# Patient Record
Sex: FEMALE | Race: WHITE | NOT HISPANIC OR LATINO | Employment: FULL TIME | URBAN - METROPOLITAN AREA
[De-identification: names, ages, dates, MRNs, and addresses within clinical notes are randomized per-mention and may not be internally consistent; named-entity substitution may affect disease eponyms.]

---

## 2023-06-05 RX ORDER — BIOTIN 1 MG
1000 TABLET ORAL DAILY
COMMUNITY

## 2023-06-05 RX ORDER — HYDROXYZINE HYDROCHLORIDE 10 MG/1
TABLET, FILM COATED ORAL
COMMUNITY
Start: 2023-02-24

## 2023-06-05 RX ORDER — DROSPIRENONE AND ETHINYL ESTRADIOL 0.02-3(28)
KIT ORAL
COMMUNITY
End: 2023-06-06

## 2023-06-05 RX ORDER — PERPHENAZINE 4 MG
TABLET ORAL
COMMUNITY
Start: 2015-01-01

## 2023-06-05 RX ORDER — CEPHALEXIN 250 MG
CAPSULE ORAL
COMMUNITY

## 2023-06-05 RX ORDER — UREA 10 %
1 LOTION (ML) TOPICAL DAILY
COMMUNITY

## 2023-06-05 RX ORDER — MINOXIDIL 2.5 MG/1
TABLET ORAL
COMMUNITY
Start: 2023-05-05

## 2023-06-05 RX ORDER — DROSPIRENONE AND ETHINYL ESTRADIOL 0.02-3(28)
1 KIT ORAL DAILY
COMMUNITY

## 2023-06-05 RX ORDER — OMEGA-3 FATTY ACIDS CAP DELAYED RELEASE 1000 MG 1000 MG
CAPSULE DELAYED RELEASE ORAL
COMMUNITY
Start: 2000-01-01

## 2023-06-05 RX ORDER — CLOBETASOL PROPIONATE 0.5 MG/G
OINTMENT TOPICAL
COMMUNITY

## 2023-06-06 ENCOUNTER — OFFICE VISIT (OUTPATIENT)
Dept: GASTROENTEROLOGY | Facility: CLINIC | Age: 48
End: 2023-06-06
Payer: COMMERCIAL

## 2023-06-06 VITALS
BODY MASS INDEX: 25.83 KG/M2 | OXYGEN SATURATION: 98 % | HEIGHT: 63 IN | HEART RATE: 65 BPM | WEIGHT: 145.8 LBS | SYSTOLIC BLOOD PRESSURE: 124 MMHG | DIASTOLIC BLOOD PRESSURE: 68 MMHG

## 2023-06-06 DIAGNOSIS — R14.0 BLOATING: Primary | ICD-10-CM

## 2023-06-06 DIAGNOSIS — K59.01 SLOW TRANSIT CONSTIPATION: ICD-10-CM

## 2023-06-06 PROCEDURE — 99204 OFFICE O/P NEW MOD 45 MIN: CPT | Performed by: INTERNAL MEDICINE

## 2023-06-06 NOTE — H&P (VIEW-ONLY)
Consultation - [facility]) [facility]  Alejandra Irving 1975 57 y o  female     ASSESSMENT @ PLAN:   She is a 16-year-old female who needs colon cancer screening  In addition she reports lifelong bloating and constipation that has responded to a combination of Juan seeds probiotics and digestive enzymes  She is straining hardness of stools and incomplete evacuations and sometimes abdominal pain with nausea  1 we will do a colonoscopy to investigate    2 I told her to continue on her probiotic regimen and to add 1 scoop of MiraLAX daily    3 we will order celiac serology panel    She may need follow-up    Chief Complaint: Abdominal pain constipation    HPI: She is a 16-year-old female with abdominal pain and constipation  She reports lifelong issues with constipation and bloating in her own words  She reports this is manifested as inflammation in her body and her own words  She reports this is worsened over the last 1 to 2 years  She is now going through her divorce which is obviously a very stressful event  She reports feeling nausea  She has no melena or hematochezia  She does have straining hardness of stools and incomplete evacuation  She has a bowel movement about once a week or once every 2 weeks  For a while she has been taking a regimen of Juan seeds probiotics and digestive enzymes and now she has a bowel movement about every 2 days  She has less straining and less hardness of stools now she sometimes has abdominal pain but not a lot  Nobody in the family has Crohn's ulcerative colitis or colon malignancy  Nobody in the family has celiac disease either  She has no upper abdominal symptoms of heartburn regurgitation dysphagia  She has never had a colonoscopy  She does report having a negative stool Cologuard  She really does not want dicyclomine or cramping pill or really a pharmaceutical medication      REVIEW OF SYSTEMS:     CONSTITUTIONAL: Denies any fever, chills, or rigors  Good appetite, and no recent weight loss  HEENT: No earache or tinnitus  Denies hearing loss or visual disturbances  CARDIOVASCULAR: No chest pain or palpitations  RESPIRATORY: Denies any cough, hemoptysis, shortness of breath or dyspnea on exertion  GASTROINTESTINAL: As noted in the History of Present Illness  GENITOURINARY: No problems with urination  Denies any hematuria or dysuria  NEUROLOGIC: No dizziness or vertigo, denies headaches  MUSCULOSKELETAL: Denies any muscle or joint pain  SKIN: Denies skin rashes or itching  ENDOCRINE: Denies excessive thirst  Denies intolerance to heat or cold  PSYCHOSOCIAL: Denies depression or anxiety  Denies any recent memory loss       Past Medical History:   Diagnosis Date   • Fibromyalgia       Past Surgical History:   Procedure Laterality Date   • FOOT SURGERY Right 2021     Social History     Socioeconomic History   • Marital status: Legally      Spouse name: Not on file   • Number of children: Not on file   • Years of education: Not on file   • Highest education level: Not on file   Occupational History   • Not on file   Tobacco Use   • Smoking status: Former     Types: Cigarettes     Quit date: 2008     Years since quitting: 15 4   • Smokeless tobacco: Never   Vaping Use   • Vaping Use: Never used   Substance and Sexual Activity   • Alcohol use: Yes     Comment: rarely   • Drug use: Never   • Sexual activity: Not on file   Other Topics Concern   • Not on file   Social History Narrative   • Not on file     Social Determinants of Health     Financial Resource Strain: Not on file   Food Insecurity: Not on file   Transportation Needs: Not on file   Physical Activity: Not on file   Stress: Not on file   Social Connections: Not on file   Intimate Partner Violence: Not on file   Housing Stability: Not on file     Family History   Problem Relation Age of Onset   • Arthritis Mother    • COPD Father    • Raynaud syndrome Sister    • Heart attack "Maternal Grandfather    • Stomach cancer Paternal Grandmother      Latex, Nickel, Pollen extract, Rhododendron, and Penicillins  Current Outpatient Medications   Medication Sig Dispense Refill   • Ashwagandha 125 MG CAPS Take by mouth     • Biotin 1000 MCG tablet Take 1,000 mcg by mouth daily     • clobetasol (TEMOVATE) 0 05 % ointment      • Collagen-Vitamin C-Biotin (Collagen 1500/C) 500-50-0 8 MG CAPS      • drospirenone-ethinyl estradiol (BRYAN) 3-0 02 MG per tablet Take 1 tablet by mouth daily     • hydrOXYzine HCL (ATARAX) 10 mg tablet      • Lactobacillus TABS Take 1 tablet by mouth daily     • minoxidil (LONITEN) 2 5 mg tablet      • Multiple Vitamins-Minerals (Multivitamin & Mineral) LIQD Take by mouth     • Omega-3 Fatty Acids (Fish Oil) 1000 MG CPDR      • Probiotic Product (UP4 PROBIOTICS ADULT PO)      • Turmeric (QC TUMERIC COMPLEX PO)        No current facility-administered medications for this visit  Blood pressure 124/68, pulse 65, height 5' 3\" (1 6 m), weight 66 1 kg (145 lb 12 8 oz), SpO2 98 %  PHYSICAL EXAM:     General Appearance:   Alert, cooperative, no distress, appears stated age    HEENT:   Normocephalic, atraumatic, anicteric      Neck:  Supple, symmetrical, trachea midline, no adenopathy;    thyroid: no enlargement/tenderness/nodules; no carotid  bruit or JVD    Lungs:   Clear to auscultation bilaterally; no rales, rhonchi or wheezing; respirations unlabored    Heart[de-identified]   S1 and S2 normal; regular rate and rhythm; no murmur, rub, or gallop     Abdomen:   Soft, non-tender, non-distended; normal bowel sounds; no masses, no organomegaly    Genitalia:   Deferred    Rectal:   Deferred    Extremities:  No cyanosis, clubbing or edema    Pulses:  2+ and symmetric all extremities    Skin:  Skin color, texture, turgor normal, no rashes or lesions    Lymph nodes:  No palpable cervical, axillary or inguinal lymphadenopathy        No results found for: \"HCT\", \"HGB\", \"MCV\", \"PLT\", \"WBC\"  No " "results found for: \"BUN\", \"CALCIUM\", \"CL\", \"CO2\", \"CREATININE\", \"GLUCOSE\", \"K\", \"NA\"  No results found for: \"ALKPHOS\", \"ALT\", \"AST\", \"BILITOT\", \"GGT\"  No results found for: \"INR\", \"PROTIME\"        Answers for HPI/ROS submitted by the patient on 5/30/2023  Chronicity: chronic  Onset: more than 1 year ago  Onset quality: gradual  Frequency: daily  Episode duration: 12 Months  Progression since onset: waxing and waning  Pain location: generalized abdominal region  Pain - numeric: 5/10  Pain quality: aching, burning, cramping, dull  Radiates to: does not radiate  anorexia: No  arthralgias: Yes  belching: No  constipation: Yes  diarrhea: No  dysuria: No  fever: No  flatus: No  frequency: Yes  headaches: No  hematochezia: No  hematuria: Yes  melena: No  myalgias: Yes  nausea: Yes  weight loss: No  vomiting: No  Aggravated by: nothing  Relieved by: nothing      "

## 2023-06-06 NOTE — PROGRESS NOTES
Consultation - [facility]  Kanchan Quinones 1975 57 y o  female     ASSESSMENT @ PLAN:   She is a 53-year-old female who needs colon cancer screening  In addition she reports lifelong bloating and constipation that has responded to a combination of Juan seeds probiotics and digestive enzymes  She is straining hardness of stools and incomplete evacuations and sometimes abdominal pain with nausea  1 we will do a colonoscopy to investigate    2 I told her to continue on her probiotic regimen and to add 1 scoop of MiraLAX daily    3 we will order celiac serology panel    She may need follow-up    Chief Complaint: Abdominal pain constipation    HPI: She is a 53-year-old female with abdominal pain and constipation  She reports lifelong issues with constipation and bloating in her own words  She reports this is manifested as inflammation in her body and her own words  She reports this is worsened over the last 1 to 2 years  She is now going through her divorce which is obviously a very stressful event  She reports feeling nausea  She has no melena or hematochezia  She does have straining hardness of stools and incomplete evacuation  She has a bowel movement about once a week or once every 2 weeks  For a while she has been taking a regimen of Juan seeds probiotics and digestive enzymes and now she has a bowel movement about every 2 days  She has less straining and less hardness of stools now she sometimes has abdominal pain but not a lot  Nobody in the family has Crohn's ulcerative colitis or colon malignancy  Nobody in the family has celiac disease either  She has no upper abdominal symptoms of heartburn regurgitation dysphagia  She has never had a colonoscopy  She does report having a negative stool Cologuard  She really does not want dicyclomine or cramping pill or really a pharmaceutical medication      REVIEW OF SYSTEMS:     CONSTITUTIONAL: Denies any fever, chills, or rigors  Good appetite, and no recent weight loss  HEENT: No earache or tinnitus  Denies hearing loss or visual disturbances  CARDIOVASCULAR: No chest pain or palpitations  RESPIRATORY: Denies any cough, hemoptysis, shortness of breath or dyspnea on exertion  GASTROINTESTINAL: As noted in the History of Present Illness  GENITOURINARY: No problems with urination  Denies any hematuria or dysuria  NEUROLOGIC: No dizziness or vertigo, denies headaches  MUSCULOSKELETAL: Denies any muscle or joint pain  SKIN: Denies skin rashes or itching  ENDOCRINE: Denies excessive thirst  Denies intolerance to heat or cold  PSYCHOSOCIAL: Denies depression or anxiety  Denies any recent memory loss       Past Medical History:   Diagnosis Date   • Fibromyalgia       Past Surgical History:   Procedure Laterality Date   • FOOT SURGERY Right 2021     Social History     Socioeconomic History   • Marital status: Legally      Spouse name: Not on file   • Number of children: Not on file   • Years of education: Not on file   • Highest education level: Not on file   Occupational History   • Not on file   Tobacco Use   • Smoking status: Former     Types: Cigarettes     Quit date: 2008     Years since quitting: 15 4   • Smokeless tobacco: Never   Vaping Use   • Vaping Use: Never used   Substance and Sexual Activity   • Alcohol use: Yes     Comment: rarely   • Drug use: Never   • Sexual activity: Not on file   Other Topics Concern   • Not on file   Social History Narrative   • Not on file     Social Determinants of Health     Financial Resource Strain: Not on file   Food Insecurity: Not on file   Transportation Needs: Not on file   Physical Activity: Not on file   Stress: Not on file   Social Connections: Not on file   Intimate Partner Violence: Not on file   Housing Stability: Not on file     Family History   Problem Relation Age of Onset   • Arthritis Mother    • COPD Father    • Raynaud syndrome Sister    • Heart attack "Maternal Grandfather    • Stomach cancer Paternal Grandmother      Latex, Nickel, Pollen extract, Rhododendron, and Penicillins  Current Outpatient Medications   Medication Sig Dispense Refill   • Ashwagandha 125 MG CAPS Take by mouth     • Biotin 1000 MCG tablet Take 1,000 mcg by mouth daily     • clobetasol (TEMOVATE) 0 05 % ointment      • Collagen-Vitamin C-Biotin (Collagen 1500/C) 500-50-0 8 MG CAPS      • drospirenone-ethinyl estradiol (BRYAN) 3-0 02 MG per tablet Take 1 tablet by mouth daily     • hydrOXYzine HCL (ATARAX) 10 mg tablet      • Lactobacillus TABS Take 1 tablet by mouth daily     • minoxidil (LONITEN) 2 5 mg tablet      • Multiple Vitamins-Minerals (Multivitamin & Mineral) LIQD Take by mouth     • Omega-3 Fatty Acids (Fish Oil) 1000 MG CPDR      • Probiotic Product (UP4 PROBIOTICS ADULT PO)      • Turmeric (QC TUMERIC COMPLEX PO)        No current facility-administered medications for this visit  Blood pressure 124/68, pulse 65, height 5' 3\" (1 6 m), weight 66 1 kg (145 lb 12 8 oz), SpO2 98 %  PHYSICAL EXAM:     General Appearance:   Alert, cooperative, no distress, appears stated age    HEENT:   Normocephalic, atraumatic, anicteric      Neck:  Supple, symmetrical, trachea midline, no adenopathy;    thyroid: no enlargement/tenderness/nodules; no carotid  bruit or JVD    Lungs:   Clear to auscultation bilaterally; no rales, rhonchi or wheezing; respirations unlabored    Heart[de-identified]   S1 and S2 normal; regular rate and rhythm; no murmur, rub, or gallop     Abdomen:   Soft, non-tender, non-distended; normal bowel sounds; no masses, no organomegaly    Genitalia:   Deferred    Rectal:   Deferred    Extremities:  No cyanosis, clubbing or edema    Pulses:  2+ and symmetric all extremities    Skin:  Skin color, texture, turgor normal, no rashes or lesions    Lymph nodes:  No palpable cervical, axillary or inguinal lymphadenopathy        No results found for: \"HCT\", \"HGB\", \"MCV\", \"PLT\", \"WBC\"  No " "results found for: \"BUN\", \"CALCIUM\", \"CL\", \"CO2\", \"CREATININE\", \"GLUCOSE\", \"K\", \"NA\"  No results found for: \"ALKPHOS\", \"ALT\", \"AST\", \"BILITOT\", \"GGT\"  No results found for: \"INR\", \"PROTIME\"        Answers for HPI/ROS submitted by the patient on 5/30/2023  Chronicity: chronic  Onset: more than 1 year ago  Onset quality: gradual  Frequency: daily  Episode duration: 12 Months  Progression since onset: waxing and waning  Pain location: generalized abdominal region  Pain - numeric: 5/10  Pain quality: aching, burning, cramping, dull  Radiates to: does not radiate  anorexia: No  arthralgias: Yes  belching: No  constipation: Yes  diarrhea: No  dysuria: No  fever: No  flatus: No  frequency: Yes  headaches: No  hematochezia: No  hematuria: Yes  melena: No  myalgias: Yes  nausea: Yes  weight loss: No  vomiting: No  Aggravated by: nothing  Relieved by: nothing      "

## 2023-06-06 NOTE — PATIENT INSTRUCTIONS
Scheduled date of colonoscopy (as of today):6/24/23  Physician performing colonoscopy: David  Location of colonoscopy:[place]  Bowel prep reviewed with patient:miralax/dulcolax  Instructions reviewed with patient by:Sharon MARTEL  Clearances:  none

## 2023-06-24 ENCOUNTER — ANESTHESIA EVENT (OUTPATIENT)
Dept: GASTROENTEROLOGY | Facility: HOSPITAL | Age: 48
End: 2023-06-24

## 2023-06-24 ENCOUNTER — APPOINTMENT (OUTPATIENT)
Dept: LAB | Facility: HOSPITAL | Age: 48
End: 2023-06-24
Payer: COMMERCIAL

## 2023-06-24 ENCOUNTER — ANESTHESIA (OUTPATIENT)
Dept: GASTROENTEROLOGY | Facility: HOSPITAL | Age: 48
End: 2023-06-24

## 2023-06-24 ENCOUNTER — HOSPITAL ENCOUNTER (OUTPATIENT)
Dept: GASTROENTEROLOGY | Facility: HOSPITAL | Age: 48
Setting detail: OUTPATIENT SURGERY
Discharge: HOME | End: 2023-06-24
Attending: INTERNAL MEDICINE | Admitting: INTERNAL MEDICINE
Payer: COMMERCIAL

## 2023-06-24 VITALS
OXYGEN SATURATION: 100 % | DIASTOLIC BLOOD PRESSURE: 57 MMHG | HEIGHT: 63 IN | WEIGHT: 145.28 LBS | SYSTOLIC BLOOD PRESSURE: 105 MMHG | TEMPERATURE: 97.5 F | HEART RATE: 60 BPM | RESPIRATION RATE: 20 BRPM | BODY MASS INDEX: 25.74 KG/M2

## 2023-06-24 DIAGNOSIS — K59.01 SLOW TRANSIT CONSTIPATION: ICD-10-CM

## 2023-06-24 DIAGNOSIS — R14.0 BLOATING: ICD-10-CM

## 2023-06-24 LAB
EXT PREGNANCY TEST URINE: NEGATIVE
EXT. CONTROL: NORMAL

## 2023-06-24 PROCEDURE — 86258 DGP ANTIBODY EACH IG CLASS: CPT

## 2023-06-24 PROCEDURE — 86231 EMA EACH IG CLASS: CPT

## 2023-06-24 PROCEDURE — 86364 TISS TRNSGLTMNASE EA IG CLAS: CPT

## 2023-06-24 PROCEDURE — 36415 COLL VENOUS BLD VENIPUNCTURE: CPT

## 2023-06-24 PROCEDURE — 82784 ASSAY IGA/IGD/IGG/IGM EACH: CPT

## 2023-06-24 PROCEDURE — 45378 DIAGNOSTIC COLONOSCOPY: CPT | Performed by: INTERNAL MEDICINE

## 2023-06-24 PROCEDURE — 81025 URINE PREGNANCY TEST: CPT | Performed by: ANESTHESIOLOGY

## 2023-06-24 RX ORDER — LIDOCAINE HYDROCHLORIDE 10 MG/ML
INJECTION, SOLUTION EPIDURAL; INFILTRATION; INTRACAUDAL; PERINEURAL AS NEEDED
Status: DISCONTINUED | OUTPATIENT
Start: 2023-06-24 | End: 2023-06-24

## 2023-06-24 RX ORDER — PROPOFOL 10 MG/ML
INJECTION, EMULSION INTRAVENOUS AS NEEDED
Status: DISCONTINUED | OUTPATIENT
Start: 2023-06-24 | End: 2023-06-24

## 2023-06-24 RX ORDER — SODIUM CHLORIDE, SODIUM LACTATE, POTASSIUM CHLORIDE, CALCIUM CHLORIDE 600; 310; 30; 20 MG/100ML; MG/100ML; MG/100ML; MG/100ML
INJECTION, SOLUTION INTRAVENOUS CONTINUOUS PRN
Status: DISCONTINUED | OUTPATIENT
Start: 2023-06-24 | End: 2023-06-24

## 2023-06-24 RX ORDER — SODIUM CHLORIDE, SODIUM LACTATE, POTASSIUM CHLORIDE, CALCIUM CHLORIDE 600; 310; 30; 20 MG/100ML; MG/100ML; MG/100ML; MG/100ML
75 INJECTION, SOLUTION INTRAVENOUS CONTINUOUS
Status: DISCONTINUED | OUTPATIENT
Start: 2023-06-24 | End: 2023-06-28 | Stop reason: HOSPADM

## 2023-06-24 RX ADMIN — LIDOCAINE HYDROCHLORIDE 5 ML: 10 INJECTION, SOLUTION EPIDURAL; INFILTRATION; INTRACAUDAL; PERINEURAL at 09:11

## 2023-06-24 RX ADMIN — PROPOFOL 150 MG: 10 INJECTION, EMULSION INTRAVENOUS at 09:11

## 2023-06-24 RX ADMIN — PROPOFOL 50 MG: 10 INJECTION, EMULSION INTRAVENOUS at 09:13

## 2023-06-24 RX ADMIN — SODIUM CHLORIDE, SODIUM LACTATE, POTASSIUM CHLORIDE, AND CALCIUM CHLORIDE: .6; .31; .03; .02 INJECTION, SOLUTION INTRAVENOUS at 09:03

## 2023-06-24 NOTE — ANESTHESIA POSTPROCEDURE EVALUATION
Post-Op Assessment Note    CV Status:  Stable    Pain management: adequate     Mental Status:  Alert and awake   Hydration Status:  Euvolemic   PONV Controlled:  Controlled   Airway Patency:  Patent      Post Op Vitals Reviewed: Yes      Staff: CRNA         No notable events documented      /59 (06/24/23 0922)    Temp 97 5 °F (36 4 °C) (06/24/23 0922)    Pulse 76 (06/24/23 0922)   Resp 16 (06/24/23 0922)    SpO2 99 % (06/24/23 0922)

## 2023-06-24 NOTE — INTERVAL H&P NOTE
H&P reviewed  After examining the patient I find no changes in the patients condition since the H&P had been written      Vitals:    06/24/23 0856   BP: 124/80   Pulse: 89   Resp: 18   Temp: (!) 97 2 °F (36 2 °C)   SpO2: 100%

## 2023-06-24 NOTE — ANESTHESIA PREPROCEDURE EVALUATION
Procedure:  COLONOSCOPY    Relevant Problems   No relevant active problems      Near Vasovagal syncope during pIV insertion  Physical Exam    Airway    Mallampati score: II  TM Distance: >3 FB  Neck ROM: full     Dental   No notable dental hx     Cardiovascular  Cardiovascular exam normal    Pulmonary  Pulmonary exam normal     Other Findings        Anesthesia Plan  ASA Score- 2     Anesthesia Type- IV sedation with anesthesia with ASA Monitors  Additional Monitors:   Airway Plan:           Plan Factors-Exercise tolerance (METS): >4 METS  Chart reviewed  EKG reviewed  Imaging results reviewed  Existing labs reviewed  Patient summary reviewed  Patient is not a current smoker  Induction- intravenous  Postoperative Plan-     Informed Consent- Anesthetic plan and risks discussed with patient  I personally reviewed this patient with the CRNA  Discussed and agreed on the Anesthesia Plan with the CRNA  Jennifer Butler

## 2023-06-29 LAB
ENDOMYSIUM IGA SER QL: NEGATIVE
GLIADIN PEPTIDE IGA SER-ACNC: 3 UNITS (ref 0–19)
GLIADIN PEPTIDE IGG SER-ACNC: 2 UNITS (ref 0–19)
IGA SERPL-MCNC: 146 MG/DL (ref 87–352)
TTG IGA SER-ACNC: <2 U/ML (ref 0–3)
TTG IGG SER-ACNC: 4 U/ML (ref 0–5)

## 2024-10-30 ENCOUNTER — TELEPHONE (OUTPATIENT)
Age: 49
End: 2024-10-30

## 2024-10-30 NOTE — TELEPHONE ENCOUNTER
Patients GI provider:  Dr. Hernandez     Number to return call: ( [phone]    Reason for call: Pt calling to see if the Provider would order SIBO TEST she is having bloating and stomach discomfort      Scheduled procedure/appointment date if applicable: Apt/procedure

## 2024-12-05 ENCOUNTER — OFFICE VISIT (OUTPATIENT)
Dept: GASTROENTEROLOGY | Facility: CLINIC | Age: 49
End: 2024-12-05
Payer: COMMERCIAL

## 2024-12-05 VITALS
WEIGHT: 141 LBS | SYSTOLIC BLOOD PRESSURE: 119 MMHG | DIASTOLIC BLOOD PRESSURE: 79 MMHG | HEART RATE: 82 BPM | TEMPERATURE: 96.5 F | HEIGHT: 63 IN | OXYGEN SATURATION: 98 % | BODY MASS INDEX: 24.98 KG/M2

## 2024-12-05 DIAGNOSIS — K59.00 CONSTIPATION, UNSPECIFIED CONSTIPATION TYPE: ICD-10-CM

## 2024-12-05 DIAGNOSIS — K58.1 IRRITABLE BOWEL SYNDROME WITH CONSTIPATION: Primary | ICD-10-CM

## 2024-12-05 DIAGNOSIS — R14.0 BLOATING: ICD-10-CM

## 2024-12-05 PROCEDURE — 99214 OFFICE O/P EST MOD 30 MIN: CPT | Performed by: PHYSICIAN ASSISTANT

## 2024-12-05 NOTE — PROGRESS NOTES
[facility] - Outpatient Consultation  June Kaplan 49 y.o. female MRN: [id number]  Encounter: [id number]          ASSESSMENT AND PLAN:      1. Irritable bowel syndrome with constipation  2. Bloating  3. Constipation    Patient has a long history of bloating, abdominal discomfort, and constipation x many years.  She underwent a colonoscopy with visualization of the terminal ileum with Dr. Hernandez in June of 2023 which was normal.  She also had negative celiac serology in 2023.   She would like to be tested for SIBO.  She consumes a high fiber diet.  No relief with Miralax. She does notice that dairy worsens her symptoms and she avoids it.    Will check a breath test for SIBO.  Kit and instructions provided to patient at the OV.  Trial of a low FODMAP diet (information sheet provided to patient).  We also briefly reviewed medications for IBS-C: Linzess, Amitiza, Trulance, IBsrela.  She prefers to avoid long term prescription medications at this time but will let us know if she would like to in the future.    ______________________________________________________________________    HPI:  Patient is a pleasant 49 year old female who presents to the office for follow up.  She is requesting testing for SIBO.  Patient reports a very long history of abdominal bloating, discomfort, and constipation for many years/lifelong.  She reports bowel movements about 1-2 times a week. She saw Dr. Hernandez in 2023 for these complaints. She underwent a colonoscopy with visualization of the terminal ileum with Dr. Hernandez in June of 2023 which was normal. She also had negative celiac serology in 2023. She would like to be tested for SIBO.  She consumes a high fiber diet.  No relief with Miralax. She does notice that dairy worsens her symptoms and she avoids it. She wishes to avoid long term prescription mediations at this time.      REVIEW OF SYSTEMS:    CONSTITUTIONAL: Denies any fever, chills,  rigors, and weight loss.  HEENT: No earache or tinnitus. Denies hearing loss or visual disturbances.  CARDIOVASCULAR: No chest pain or palpitations.   RESPIRATORY: Denies any cough, hemoptysis, shortness of breath or dyspnea on exertion.  GASTROINTESTINAL: As noted in the History of Present Illness.   GENITOURINARY: No problems with urination. Denies any hematuria or dysuria.  NEUROLOGIC: No dizziness or vertigo, denies headaches.   MUSCULOSKELETAL: Denies any muscle or joint pain.   SKIN: Denies skin rashes or itching.   ENDOCRINE: Denies excessive thirst. Denies intolerance to heat or cold.  PSYCHOSOCIAL: Denies depression or anxiety. Denies any recent memory loss.       Historical Information   Past Medical History:   Diagnosis Date    Fibromyalgia      Past Surgical History:   Procedure Laterality Date    FOOT SURGERY Right      Social History   Social History     Substance and Sexual Activity   Alcohol Use Yes    Comment: rarely     Social History     Substance and Sexual Activity   Drug Use Never     Social History     Tobacco Use   Smoking Status Former    Current packs/day: 0.00    Types: Cigarettes    Quit date:     Years since quittin.9   Smokeless Tobacco Never     Family History   Problem Relation Age of Onset    Arthritis Mother     COPD Father     Raynaud syndrome Sister     Heart attack Maternal Grandfather     Stomach cancer Paternal Grandmother        Meds/Allergies       Current Outpatient Medications:     Ashwagandha 125 MG CAPS    Biotin 1000 MCG tablet    clobetasol (TEMOVATE) 0.05 % ointment    Collagen-Vitamin C-Biotin (Collagen 1500/C) 500-50-0.8 MG CAPS    drospirenone-ethinyl estradiol (BRYAN) 3-0.02 MG per tablet    hydrOXYzine HCL (ATARAX) 10 mg tablet    Lactobacillus TABS    Multiple Vitamins-Minerals (Multivitamin & Mineral) LIQD    Omega-3 Fatty Acids (Fish Oil) 1000 MG CPDR    Probiotic Product (UP4 PROBIOTICS ADULT PO)    Turmeric (QC TUMERIC COMPLEX PO)     "Drospirenone-Estetrol 3-14.2 MG TABS    minoxidil (LONITEN) 2.5 mg tablet    Allergies   Allergen Reactions    Latex Dermatitis    Nickel Dermatitis    Pollen Extract Other (See Comments)    Rhododendron Hives    Amoxicillin-Pot Clavulanate Rash and Lip Swelling     AUGMENTIN    Penicillins Rash     Other reaction(s): Rash, Unknown  Relatives have allergy  Relatives have allergy             Objective     Blood pressure 119/79, pulse 82, temperature (!) 96.5 °F (35.8 °C), temperature source Temporal, height 5' 3\" (1.6 m), weight 64 kg (141 lb), SpO2 98%. Body mass index is 24.98 kg/m².        PHYSICAL EXAM:      General Appearance:   Alert, cooperative, no distress   HEENT:   Normocephalic, atraumatic, anicteric.     Neck:  Supple, symmetrical, trachea midline   Lungs:   Clear to auscultation bilaterally; no rales, rhonchi or wheezing; respirations unlabored    Heart::   Regular rate and rhythm; no murmur, rub, or gallop.   Abdomen:   Soft, non-tender, non-distended; normal bowel sounds; no masses, no organomegaly    Genitalia:   Deferred    Rectal:   Deferred    Extremities:  No cyanosis, clubbing or edema    Pulses:  2+ and symmetric    Skin:  No jaundice, rashes, or lesions    Lymph nodes:  No palpable cervical lymphadenopathy        Lab Results:   No visits with results within 1 Day(s) from this visit.   Latest known visit with results is:   Hospital Outpatient Visit on 06/24/2023   Component Date Value    EXT Preg Test, Ur 06/24/2023 Negative     Control 06/24/2023 Valid          Radiology Results:   US breast bilateral limited (diagnostic)  Result Date: 11/27/2024  Narrative: Result MAMMOGRAM SCREENING NORMA BILATERAL US BREAST SCREENING BILATERAL History Screening mammogram for breast cancer The patient has no documented relevant family history. Films Compared 10/13/2023 US BREAST SCREENING LEFT, 10/13/2023 US BREAST SCREENING RIGHT, 09/22/2023 MAMMOGRAM SCREENING NORMA BILATERAL, 07/20/2022 MAMMOGRAM SCREENING " NORMA BILATERAL, 04/27/2021 MAMMOGRAM SCREENING NORMA BILATERAL, 07/11/2019 MAMMOGRAM SCREENING NORMA BILATERAL, 06/15/2018 MAMMOGRAM SCREENING NORMA BILATERAL, 06/02/2017 MAMMOGRAM, DIAGNOSTIC, UNILAT, 06/02/2017 US BREAST-UNILATERAL LIMITED, and 05/31/2017 MAMMOGRAM, SCREENING, BILAT Findings The breasts are heterogeneously dense, which may obscure small masses. The breast tissue has a heterogeneous background echotexture. MAMMOGRAM SCREENING NORMA BILATERAL There is no evidence of suspicious masses, calcifications, or other abnormal findings. US BREAST SCREENING BILATERAL There is no evidence of suspicious masses or other abnormal findings. Impression Bilateral MAMMOGRAM SCREENING NORMA BILATERAL No mammographic evidence of malignancy. Bilateral US BREAST SCREENING BILATERAL No sonographic evidence of malignancy. BI-RADS® Category: 1 - Negative. Recommendation Screening mammogram in 1 year is recommended for both breasts with the option of supplemental screening whole breast ultrasonography for this patient with dense breast parenchyma. Digital breast tomosynthesis was performed. This digital mammogram has been analyzed with the computer aided detection system. Breast tissue can be either dense or not dense. Dense tissue makes it harder to find breast cancer on a mammogram and also raises the risk of developing breast cancer. Your breast tissue is dense. In some people with dense tissue, other imaging tests in addition to a mammogram may help find cancers. Talk to your healthcare provider about breast density, risks for breast cancer, and your individual situation. This examination was performed at [facility] , [address] [place] [id number]. [phone]    Mammo screening bilateral w 3d and cad  Result Date: 11/27/2024  Narrative: Result MAMMOGRAM SCREENING NORMA BILATERAL US BREAST SCREENING BILATERAL History Screening mammogram for breast cancer The patient has no documented relevant family  history. Films Compared 10/13/2023 US BREAST SCREENING LEFT, 10/13/2023 US BREAST SCREENING RIGHT, 09/22/2023 MAMMOGRAM SCREENING NORMA BILATERAL, 07/20/2022 MAMMOGRAM SCREENING NORMA BILATERAL, 04/27/2021 MAMMOGRAM SCREENING NORMA BILATERAL, 07/11/2019 MAMMOGRAM SCREENING NORMA BILATERAL, 06/15/2018 MAMMOGRAM SCREENING NORMA BILATERAL, 06/02/2017 MAMMOGRAM, DIAGNOSTIC, UNILAT, 06/02/2017 US BREAST-UNILATERAL LIMITED, and 05/31/2017 MAMMOGRAM, SCREENING, BILAT Findings The breasts are heterogeneously dense, which may obscure small masses. The breast tissue has a heterogeneous background echotexture. MAMMOGRAM SCREENING NORMA BILATERAL There is no evidence of suspicious masses, calcifications, or other abnormal findings. US BREAST SCREENING BILATERAL There is no evidence of suspicious masses or other abnormal findings. Impression Bilateral MAMMOGRAM SCREENING NORMA BILATERAL No mammographic evidence of malignancy. Bilateral US BREAST SCREENING BILATERAL No sonographic evidence of malignancy. BI-RADS® Category: 1 - Negative. Recommendation Screening mammogram in 1 year is recommended for both breasts with the option of supplemental screening whole breast ultrasonography for this patient with dense breast parenchyma. Digital breast tomosynthesis was performed. This digital mammogram has been analyzed with the computer aided detection system. Breast tissue can be either dense or not dense. Dense tissue makes it harder to find breast cancer on a mammogram and also raises the risk of developing breast cancer. Your breast tissue is dense. In some people with dense tissue, other imaging tests in addition to a mammogram may help find cancers. Talk to your healthcare provider about breast density, risks for breast cancer, and your individual situation. This examination was performed at [facility] , [address] [place] [address]. [phone]

## 2025-03-10 ENCOUNTER — TELEPHONE (OUTPATIENT)
Dept: GASTROENTEROLOGY | Facility: CLINIC | Age: 50
End: 2025-03-10

## 2025-03-10 NOTE — TELEPHONE ENCOUNTER
Patient dropped off SIBO test. Sent it via  to [facility] Attn: Yasmeen Hernandez

## 2025-03-11 ENCOUNTER — CLINICAL SUPPORT (OUTPATIENT)
Dept: GASTROENTEROLOGY | Facility: CLINIC | Age: 50
End: 2025-03-11
Payer: COMMERCIAL

## 2025-03-11 ENCOUNTER — TELEPHONE (OUTPATIENT)
Age: 50
End: 2025-03-11

## 2025-03-11 DIAGNOSIS — R14.0 BLOATING: ICD-10-CM

## 2025-03-11 DIAGNOSIS — K59.00 CONSTIPATION, UNSPECIFIED CONSTIPATION TYPE: Primary | ICD-10-CM

## 2025-03-11 PROCEDURE — 91065 BREATH HYDROGEN/METHANE TEST: CPT | Performed by: PHYSICIAN ASSISTANT

## 2025-03-11 NOTE — LETTER
March 14, 2025     Syed Bautista DO  [address] [place] [address]    Patient: June Kaplan   YOB: 1975   Date of Visit: 3/11/2025       Dear Dr. Bautista:    Thank you for referring June Kaplan to me for evaluation. Below are my notes for this consultation.    If you have questions, please do not hesitate to call me. I look forward to following your patient along with you.         Sincerely,        Maico Clifford MD        CC: CHERYL Rowell MD  3/14/2025 12:16 PM  Sign when Signing Visit  [facility]       Bacterial Overgrowth Analytical Record    June Kaplan 49 y.o. female MRN: [id number]      Date of Test: 3-8-25    Substrate Given: Lactulose    Ordering Provider: Marylu Adams PA-C     Medical Assistant: Anabel    Symptoms: constipation and bloating    The patient presents for bacterial overgrowth testing.    Patient fasted overnight. Baseline readings obtained.   Breath test performed every 20 min for a total of 3 hr    Sample Clock Time ppmH2 ppmCH4 Co2% Saw   Baseline   7:35 9 4 3.0 1.83   #1  20 minutes 8:00 10 3 3.4 1.61   #2  40 minutes 8:23 1 0 0.4 Too high   #3  60 minutes 8:44 11 5 3.0 1.83   #4  80 minutes 9:05 13 5 3.0 1.83   #5  100 minutes 9:27 47 10 2.8 1.96   #6  120 minutes 9:50 38 8 3.5 1.57   #7  140 minutes 10:10 64 11 2.9 1.87   #8  160 minutes 10:30 5 2 0.9 Too high   #9  180 minutes 10:51 4 1 0.4 Too high       Please forward to Marylu Adams PA-C  once reviewed, Thanks!    Physician interpretation: Abnormal breath test consistent with SIBO.  Antibiotics recommended

## 2025-03-11 NOTE — TELEPHONE ENCOUNTER
Patient was not aware that she was scheduled for a nurse visit today to drop off a breath test.  She says that she dropped it off yesterday.

## 2025-03-12 NOTE — TELEPHONE ENCOUNTER
Spoke with pt and informed her that I was unaware of any nurse visit to deliver the SIBO kit. I advised her to ignore the visit notification and informed her that the completed SIBO kit she dropped off on 3/10/25 has been forwarded to [facility] for processing.

## 2025-03-14 ENCOUNTER — TELEPHONE (OUTPATIENT)
Dept: OTHER | Facility: HOSPITAL | Age: 50
End: 2025-03-14

## 2025-03-14 DIAGNOSIS — K58.0 IRRITABLE BOWEL SYNDROME WITH DIARRHEA: Primary | ICD-10-CM

## 2025-03-14 NOTE — TELEPHONE ENCOUNTER
Patients GI provider:  Marylu Adams PA-C    Number to return call: ([phone]     Reason for call: Pt calling to speak with Marylu about alternative medication for rifaximin (Xifaxan) 550 mg tablet.  Pt states today is her last day at her job and with ins. Pharmacy states ins will not pay for current script.    Scheduled procedure/appointment date if applicable: N/A

## 2025-03-14 NOTE — PROGRESS NOTES
[facility]       Bacterial Overgrowth Analytical Record    June Kaplan 49 y.o. female MRN: [id number]      Date of Test: 3-8-25    Substrate Given: Lactulose    Ordering Provider: Marylu Adams PA-C     Medical Assistant: Anabel    Symptoms: constipation and bloating    The patient presents for bacterial overgrowth testing.    Patient fasted overnight. Baseline readings obtained.   Breath test performed every 20 min for a total of 3 hr    Sample Clock Time ppmH2 ppmCH4 Co2% Saw   Baseline   7:35 9 4 3.0 1.83   #1  20 minutes 8:00 10 3 3.4 1.61   #2  40 minutes 8:23 1 0 0.4 Too high   #3  60 minutes 8:44 11 5 3.0 1.83   #4  80 minutes 9:05 13 5 3.0 1.83   #5  100 minutes 9:27 47 10 2.8 1.96   #6  120 minutes 9:50 38 8 3.5 1.57   #7  140 minutes 10:10 64 11 2.9 1.87   #8  160 minutes 10:30 5 2 0.9 Too high   #9  180 minutes 10:51 4 1 0.4 Too high       Please forward to Marylu Adams PA-C  once reviewed, Thanks!    Physician interpretation: Abnormal breath test consistent with SIBO.  Antibiotics recommended         
no

## 2025-03-14 NOTE — TELEPHONE ENCOUNTER
Please start urgent prior auth for xifaxin through cover my meds.    Can call if any issues and they will fax a form.

## 2025-03-14 NOTE — TELEPHONE ENCOUNTER
Called pt and relayed SIBO test results and treatment as per BERRY MARIEE   Pt voiced understanding.

## 2025-03-17 NOTE — TELEPHONE ENCOUNTER
Called pt and LMOM with advised on approval and to call us back if medication has a high co-pay so we can help her enroll in a patient assistance program.

## 2025-03-17 NOTE — TELEPHONE ENCOUNTER
PA for Xifaxan 550 mg    SUBMITTED to [employer]    via    [x]CMM-KEY: [id number]      All office notes, labs and other pertaining documents and studies sent. Clinical questions answered. Awaiting determination from insurance company.     Turnaround time for your insurance to make a decision on your Prior Authorization can take 7-21 business days.

## 2025-03-17 NOTE — TELEPHONE ENCOUNTER
PA for Xifaxan   APPROVED     Date(s) approved 3/17/2026      This medication is a possible high dollar medication. The patient has not been contacted regarding the decision as the office clinical team will handle advising the patient and if/any patient assistance that may have been started.  Thank you.           Approval letter scanned into [employer]

## 2025-03-20 NOTE — TELEPHONE ENCOUNTER
Patients GI provider:  Bryan    Number to return call: [phone]    Reason for call: Pt returning the call on the message left and requesting the low cost for the Xifaxan which was approved on the 3/17/25. Pt stated her insurance from the previous job  on the 3/14/25 and her new job started already but insurance will start in 30 days. Please call pt for the further assistance.     Scheduled procedure/appointment date if applicable: none

## 2025-03-20 NOTE — TELEPHONE ENCOUNTER
Called pt and advised that we can start with a patient assistance program now that pt doesn't have an active insurance.  Called pt's pharmacy to know the price of Xifaxan out of packet.  Pharmacist stated that pt will have to pay $3000.  Received fax from pharmacy with proved of price on medication.

## 2025-03-21 NOTE — TELEPHONE ENCOUNTER
Called pt and LMOM that she needs to sign the patient assistance forms and to call me back and let me know if coming to the office or wants me to mail them to her.  Office # provided.

## 2025-04-09 ENCOUNTER — TELEPHONE (OUTPATIENT)
Dept: GASTROENTEROLOGY | Facility: CLINIC | Age: 50
End: 2025-04-09